# Patient Record
Sex: FEMALE | Race: WHITE | NOT HISPANIC OR LATINO | Employment: FULL TIME | URBAN - NONMETROPOLITAN AREA
[De-identification: names, ages, dates, MRNs, and addresses within clinical notes are randomized per-mention and may not be internally consistent; named-entity substitution may affect disease eponyms.]

---

## 2020-06-02 ENCOUNTER — HISTORICAL (OUTPATIENT)
Dept: ADMINISTRATIVE | Facility: HOSPITAL | Age: 35
End: 2020-06-02

## 2020-06-25 LAB — POC BETA-HCG (QUAL): NEGATIVE

## 2020-09-23 ENCOUNTER — HISTORICAL (OUTPATIENT)
Dept: ADMINISTRATIVE | Facility: HOSPITAL | Age: 35
End: 2020-09-23

## 2021-08-05 LAB
BILIRUB SERPL-MCNC: NEGATIVE MG/DL
BLOOD URINE, POC: NEGATIVE
CLARITY, POC UA: CLEAR
COLOR, POC UA: YELLOW
GLUCOSE UR QL STRIP: NEGATIVE
HUMAN PAPILLOMAVIRUS (HPV): NORMAL
KETONES UR QL STRIP: NEGATIVE
LEUKOCYTE EST, POC UA: NEGATIVE
NITRITE, POC UA: NEGATIVE
PAP RECOMMENDATION EXT: NORMAL
PAP SMEAR: NORMAL
PH, POC UA: 7
PROTEIN, POC: NEGATIVE
SPECIFIC GRAVITY, POC UA: 1.01
UROBILINOGEN, POC UA: NORMAL

## 2021-08-11 ENCOUNTER — HISTORICAL (OUTPATIENT)
Dept: ADMINISTRATIVE | Facility: HOSPITAL | Age: 36
End: 2021-08-11

## 2021-08-11 LAB
EST. AVERAGE GLUCOSE BLD GHB EST-MCNC: 96 MG/DL (ref 70–115)
HBA1C MFR BLD: 5.2 % (ref 4–6)
T4 FREE SERPL-MCNC: 0.91 NG/DL (ref 0.78–2.19)
TSH SERPL-ACNC: 1.06 UIU/ML (ref 0.36–3.74)

## 2021-10-14 LAB — POC BETA-HCG (QUAL): NEGATIVE

## 2022-04-10 ENCOUNTER — HISTORICAL (OUTPATIENT)
Dept: ADMINISTRATIVE | Facility: HOSPITAL | Age: 37
End: 2022-04-10

## 2022-04-20 LAB
PAP RECOMMENDATION EXT: NORMAL
PAP SMEAR: NORMAL

## 2022-04-30 VITALS
WEIGHT: 166.69 LBS | HEIGHT: 60 IN | WEIGHT: 176.38 LBS | HEIGHT: 61 IN | DIASTOLIC BLOOD PRESSURE: 80 MMHG | BODY MASS INDEX: 32.73 KG/M2 | SYSTOLIC BLOOD PRESSURE: 130 MMHG | DIASTOLIC BLOOD PRESSURE: 90 MMHG | BODY MASS INDEX: 33.3 KG/M2 | SYSTOLIC BLOOD PRESSURE: 132 MMHG

## 2022-05-02 NOTE — HISTORICAL [facility] CERNER
This is a historical note converted from Javier. Formatting and pictures may have been removed.  Please reference Javier for original formatting and attached multimedia. Chief Complaint  back pain  History of Present Illness  Shes had nasal congestion, sneezing, post nasal drip for about 2 weeks.? Those symptoms have worsened over the last several days and shes coughing more now.? 2 days ago she had severe coughing episodes and she felt a sudden sharp pain in left back that has persisted.? She think she heard a pop during that episode.? She has mild DALY.? She has mild postnasal mucous drip.? She hasnt had any fever.? She denies hemoptysis.  Review of Systems  no vomiting or diarrhea or abdominal pain  no Covid exposure  Physical Exam  Vitals & Measurements  T:?37.3? ?C (Oral)? HR:?87(Peripheral)? BP:?132/90?  HT:?154.50?cm? WT:?80.000?kg? BMI:?33.51?  she does not appear to be in distress  conjunctiva clear  dark lower eyelids  OP/OC clear  neck supple without LAD  heart is RRR without murmurs  shes breathing normally, she has a few faint wheezes?on expiration, she has some inspiratory rales in the lung bases, she has equal breath sounds in all areas  she is tender to palpation in the left lower thoracic muscles posteriorly - fairly localized  ?  ?  2 view CXR with one extra view of the posterior ribs on the left side shows some bronchial wall thickening, no rib fractures, no focal infiltrates, no pneumothorax  Assessment/Plan  1.?Bronchitis ? J40  2.?Sinusitis ? J32.9  Orders:  albuterol, 2 puff(s), INH, q4hr, PRN PRN for wheezing, # 1 EA, 0 Refill(s), called to pharmacy (Rx)  azithromycin, = 1 packet(s), Oral, As Directed, per package labeling, X 5 day(s), # 6 tab(s), 0 Refill(s), called to pharmacy (Rx)  cefdinir, 300 mg = 1 cap(s), Oral, q12hr, X 10 day(s), # 20 cap(s), 0 Refill(s), called to pharmacy (Rx)  predniSONE, 40 mg = 2 tab(s), Oral, Daily, X 3 day(s), # 6 tab(s), 0 Refill(s), called to pharmacy  (Rx)  traMADol, 100 mg = 1 tab(s), Oral, q6hr, PRN PRN as needed for pain, take with 500mg of acetaminophen, # 30 tab(s), 0 Refill(s), called to pharmacy (Rx)  ?  I think the back pain is muscle strain related  call if the symptoms worsen or persist   Problem List/Past Medical History  Ongoing  High risk HPV infection  Obesity  Historical  Pregnant  Pregnant  Pregnant  Pregnant  Procedure/Surgical History  PAP (2020)   section (10/13/2016)  Tubal ligation (10/13/2016)   section (10/22/2010)   section (2009)   section (2004)   Medications  cefdinir 300 mg oral capsule, 300 mg= 1 cap(s), Oral, q12hr  human papillomavirus vaccine 9-valent intramuscular suspension, 0.5 mL, IM, Once  prednisONE 20 mg oral tablet, 40 mg= 2 tab(s), Oral, Daily  ProAir HFA 90 mcg/inh inhalation aerosol with adapter, 2 puff(s), INH, q4hr, PRN  traMADol 100 mg oral tablet, 100 mg= 1 tab(s), Oral, q6hr, PRN  Zithromax Z-Rogelio 250 mg oral tablet, 1 packet(s), Oral, As Directed  Allergies  No Known Allergies  No Known Medication Allergies  Social History  Abuse/Neglect  No, No, Yes, 2020  Alcohol  Past, 2020  Sexual  Sexually active: Yes. No, 2020  Substance Use  Never, 2020  Tobacco  Never (less than 100 in lifetime), N/A, 2020  Family History  Hyperlipidemia: Mother.  Hypertension.: Father.  Primary malignant neoplasm of breast: Negative: Mother, Father, Sister and Brother.  Primary malignant neoplasm of colon: Negative: Mother, Father, Sister and Brother.  Primary malignant neoplasm of female genital organ: Negative: Mother, Father, Sister and Brother.  Immunizations  Vaccine Date Status Comments   human papillomavirus vaccine 2020 Given Pt tolerated well. -KO 2020  EXP: 2021   influenza virus vaccine, inactivated 10/14/2016 Recorded    tetanus/diphtheria/pertussis, acel(Tdap) 2016 Recorded    influenza virus vaccine, inactivated 2016  Recorded    measles/mumps/rubella virus vaccine 06/28/1997 Recorded    poliovirus vaccine, live, trivalent 02/19/1987 Recorded    poliovirus vaccine, live, trivalent 05/03/1984 Recorded    measles/mumps/rubella virus vaccine 05/03/1984 Recorded    poliovirus vaccine, live, trivalent 1983 Recorded    poliovirus vaccine, live, trivalent 1983 Recorded    Health Maintenance  Health Maintenance  ???Pending?(in the next year)  ??? ??Due?  ??? ? ? ?Influenza Vaccine due??09/23/20??and every?  ??? ??Due In Future?  ??? ? ? ?Obesity Screening not due until??01/01/21??and every 1??year(s)  ??? ? ? ?Alcohol Misuse Screening not due until??01/02/21??and every 1??year(s)  ???Satisfied?(in the past 1 year)  ??? ??Satisfied?  ??? ? ? ?ADL Screening on??09/23/20.??Satisfied by Mendy Novoa  ??? ? ? ?Alcohol Misuse Screening on??09/23/20.??Satisfied by Mendy Novoa  ??? ? ? ?Blood Pressure Screening on??09/23/20.??Satisfied by Mendy Novoa  ??? ? ? ?Body Mass Index Check on??09/23/20.??Satisfied by Mendy Novoa  ??? ? ? ?Cervical Cancer Screening on??06/14/20.  ??? ? ? ?Depression Screening on??09/23/20.??Satisfied by Mendy Novoa  ??? ? ? ?Obesity Screening on??09/23/20.??Satisfied by Mendy Novoa  ?

## 2022-05-04 ENCOUNTER — HISTORICAL (OUTPATIENT)
Dept: ADMINISTRATIVE | Facility: HOSPITAL | Age: 37
End: 2022-05-04

## 2022-09-21 ENCOUNTER — HISTORICAL (OUTPATIENT)
Dept: ADMINISTRATIVE | Facility: HOSPITAL | Age: 37
End: 2022-09-21

## 2023-02-02 ENCOUNTER — HOSPITAL ENCOUNTER (OUTPATIENT)
Dept: RADIOLOGY | Facility: HOSPITAL | Age: 38
Discharge: HOME | End: 2023-02-02
Attending: OBSTETRICS & GYNECOLOGY
Payer: COMMERCIAL

## 2023-02-02 ENCOUNTER — DOCUMENTATION ONLY (OUTPATIENT)
Dept: ADMINISTRATIVE | Facility: HOSPITAL | Age: 38
End: 2023-02-02
Payer: COMMERCIAL

## 2023-02-02 VITALS — WEIGHT: 165 LBS | HEIGHT: 60 IN | BODY MASS INDEX: 32.39 KG/M2

## 2023-02-02 DIAGNOSIS — Z12.31 BREAST CANCER SCREENING BY MAMMOGRAM: ICD-10-CM

## 2023-02-02 DIAGNOSIS — R10.2 PELVIC PAIN IN FEMALE: ICD-10-CM

## 2023-02-02 PROCEDURE — 77067 SCR MAMMO BI INCL CAD: CPT | Mod: TC

## 2023-02-02 PROCEDURE — 76856 US EXAM PELVIC COMPLETE: CPT | Mod: TC

## 2023-07-21 ENCOUNTER — TELEPHONE (OUTPATIENT)
Dept: FAMILY MEDICINE | Facility: CLINIC | Age: 38
End: 2023-07-21
Payer: COMMERCIAL

## 2023-07-21 NOTE — TELEPHONE ENCOUNTER
Rosita called stating that she would like to become a new pt with Dr Enriquez. Rosita also stated that Dr Enriquez already sees her  and children. Rosita stated that she also seems to having high BP lately. Please advise.

## 2023-07-25 ENCOUNTER — TELEPHONE (OUTPATIENT)
Dept: FAMILY MEDICINE | Facility: CLINIC | Age: 38
End: 2023-07-25
Payer: COMMERCIAL

## 2023-07-26 ENCOUNTER — OFFICE VISIT (OUTPATIENT)
Dept: FAMILY MEDICINE | Facility: CLINIC | Age: 38
End: 2023-07-26
Payer: COMMERCIAL

## 2023-07-26 VITALS
SYSTOLIC BLOOD PRESSURE: 160 MMHG | BODY MASS INDEX: 32.12 KG/M2 | OXYGEN SATURATION: 100 % | HEIGHT: 60 IN | HEART RATE: 80 BPM | TEMPERATURE: 97 F | WEIGHT: 163.63 LBS | DIASTOLIC BLOOD PRESSURE: 114 MMHG

## 2023-07-26 DIAGNOSIS — E66.3 OVERWEIGHT: ICD-10-CM

## 2023-07-26 DIAGNOSIS — I10 HYPERTENSION, UNSPECIFIED TYPE: Primary | ICD-10-CM

## 2023-07-26 PROCEDURE — 1159F PR MEDICATION LIST DOCUMENTED IN MEDICAL RECORD: ICD-10-PCS | Mod: CPTII,,, | Performed by: PEDIATRICS

## 2023-07-26 PROCEDURE — 3077F PR MOST RECENT SYSTOLIC BLOOD PRESSURE >= 140 MM HG: ICD-10-PCS | Mod: CPTII,,, | Performed by: PEDIATRICS

## 2023-07-26 PROCEDURE — 3008F BODY MASS INDEX DOCD: CPT | Mod: CPTII,,, | Performed by: PEDIATRICS

## 2023-07-26 PROCEDURE — 3077F SYST BP >= 140 MM HG: CPT | Mod: CPTII,,, | Performed by: PEDIATRICS

## 2023-07-26 PROCEDURE — 99214 OFFICE O/P EST MOD 30 MIN: CPT | Mod: ,,, | Performed by: PEDIATRICS

## 2023-07-26 PROCEDURE — 1160F RVW MEDS BY RX/DR IN RCRD: CPT | Mod: CPTII,,, | Performed by: PEDIATRICS

## 2023-07-26 PROCEDURE — 3080F PR MOST RECENT DIASTOLIC BLOOD PRESSURE >= 90 MM HG: ICD-10-PCS | Mod: CPTII,,, | Performed by: PEDIATRICS

## 2023-07-26 PROCEDURE — 3008F PR BODY MASS INDEX (BMI) DOCUMENTED: ICD-10-PCS | Mod: CPTII,,, | Performed by: PEDIATRICS

## 2023-07-26 PROCEDURE — 1160F PR REVIEW ALL MEDS BY PRESCRIBER/CLIN PHARMACIST DOCUMENTED: ICD-10-PCS | Mod: CPTII,,, | Performed by: PEDIATRICS

## 2023-07-26 PROCEDURE — 99214 PR OFFICE/OUTPT VISIT, EST, LEVL IV, 30-39 MIN: ICD-10-PCS | Mod: ,,, | Performed by: PEDIATRICS

## 2023-07-26 PROCEDURE — 1159F MED LIST DOCD IN RCRD: CPT | Mod: CPTII,,, | Performed by: PEDIATRICS

## 2023-07-26 PROCEDURE — 3080F DIAST BP >= 90 MM HG: CPT | Mod: CPTII,,, | Performed by: PEDIATRICS

## 2023-07-26 RX ORDER — LISINOPRIL AND HYDROCHLOROTHIAZIDE 10; 12.5 MG/1; MG/1
1 TABLET ORAL DAILY
Qty: 90 TABLET | Refills: 3 | Status: SHIPPED | OUTPATIENT
Start: 2023-07-26 | End: 2024-07-25

## 2023-07-26 NOTE — PROGRESS NOTES
Subjective     Patient ID: Rosita Eisenberg is a 38 y.o. female.    Chief Complaint: Establish Care (HTN)    HPI     She has noticed increased blood pressure readings for the last few weeks.  Her readings at home have been 150-160's/'s.  She's otherwise been well.     Objective     Physical Exam     She looks well  Sclera and conjunctiva normal  Neck supple without LAD or mass or thyromegaly or bruit  Heart RRR without murmurs, no gallops  Lungs clear, normal breathing  Abdomen soft without HSM or mass, no bruits  No edema  Systolic blood pressure in the leg by palpation is 160  Dorsalis pedis pulses in the feet are normal  No edema    Assessment and Plan     1. Hypertension, unspecified type    2. Overweight      She'll return for fasting blood tests  Renal U/S with vascular doppler to screen for DEEPTHI  We discussed diet modification, exercise, weight loss  I asked her to call in 1-2 weeks to let me know her home blood pressure readings

## 2023-07-27 DIAGNOSIS — I10 HYPERTENSION, UNSPECIFIED TYPE: Primary | ICD-10-CM

## 2023-07-27 PROCEDURE — 85025 COMPLETE CBC W/AUTO DIFF WBC: CPT | Performed by: PEDIATRICS

## 2023-07-27 PROCEDURE — 80061 LIPID PANEL: CPT | Performed by: PEDIATRICS

## 2023-07-27 PROCEDURE — 83036 HEMOGLOBIN GLYCOSYLATED A1C: CPT | Performed by: PEDIATRICS

## 2023-07-27 PROCEDURE — 84443 ASSAY THYROID STIM HORMONE: CPT | Performed by: PEDIATRICS

## 2023-07-27 PROCEDURE — 80053 COMPREHEN METABOLIC PANEL: CPT | Performed by: PEDIATRICS

## 2023-07-31 ENCOUNTER — HOSPITAL ENCOUNTER (OUTPATIENT)
Dept: RADIOLOGY | Facility: HOSPITAL | Age: 38
Discharge: HOME | End: 2023-07-31
Attending: PEDIATRICS
Payer: COMMERCIAL

## 2023-07-31 DIAGNOSIS — I10 HYPERTENSION, UNSPECIFIED TYPE: ICD-10-CM

## 2023-07-31 PROCEDURE — 76770 US EXAM ABDO BACK WALL COMP: CPT | Mod: TC

## 2023-08-18 ENCOUNTER — TELEPHONE (OUTPATIENT)
Dept: FAMILY MEDICINE | Facility: CLINIC | Age: 38
End: 2023-08-18
Payer: COMMERCIAL

## 2023-08-28 PROCEDURE — 80048 BASIC METABOLIC PNL TOTAL CA: CPT | Performed by: PEDIATRICS

## 2024-05-21 ENCOUNTER — PATIENT MESSAGE (OUTPATIENT)
Dept: FAMILY MEDICINE | Facility: CLINIC | Age: 39
End: 2024-05-21
Payer: COMMERCIAL

## 2024-06-03 ENCOUNTER — TELEPHONE (OUTPATIENT)
Dept: OBSTETRICS AND GYNECOLOGY | Facility: CLINIC | Age: 39
End: 2024-06-03
Payer: COMMERCIAL

## 2024-06-19 NOTE — PROGRESS NOTES
Chief Complaint:   Well Woman     History of Present Illness:  Rosita Eisenberg is a 39 y.o. year old  sp BTL  presents for her well woman exam.  Having regualr monthly cycles lasting 7-10 days with moderate to heavy bleeding. Changing a pad every 2 hours, requiring a pad and tampon at the same time. +soils clothes. Denies any irregular menstrual bleeding. +dysmenorrhea. Tried OTC medications, no improvement. History of OCPs and IUD with no improvement.      Gyn History:  Menstrual History   Cycle: Yes (2023)  Menarche Age: 9 years  Flow Duration: 10  Flow: (!) Heavy  Interval: 28  Intermenstrual Bleeding: No  Dysmenorrhea: Yes  Dysmenorrhea Severity : Moderate (moderate to severe)  [place]  Sexually Active: Yes  Sexual Orientation: heterosexual  Postcoital Bleeding: No  Dyspareunia: No  STI History: No  Contraception: Yes  Contraception Type: Tubal ligation/salpingectony  Menopause  Menopause Age: 1 years  Breast History  Last Breast Imaging Date: Yes  Date: 23 (benign)  History of Abnormal Breast Imaging : No  History of Breast Biopsy: No  Pap History   Last pap date: 23  Result: Normal  History of Abnormal Pap: No  HPV Vaccine Completed: No (1/3)      Review of Systems:  General/Constitutional: Chills denies. Fatigue/weakness denies. Fever denies. Night sweats denies. Hot flashes denies    Respiratory: Cough denies. Hemoptysis denies. SOB denies. Sputum production denies. Wheezing denies .   Cardiovascular: Chest pain denies. Dizziness denies. Palpitations denies. Swelling in hands/feet denies.                Breast: Dimpling denies. Breast mass denies. Breast pain/tenderness denies. Nipple discharge denies. Puckering denies.  Gastrointestinal: Abdominal pain denies. Blood in stool denies. Constipation denies. Diarrhea denies. Heartburn denies. Nausea denies. Vomiting denies    Genitourinary: Incontinence denies. Blood in urine denies. Frequent urination denies. Painful urination  denies. Urinary urgency denies. Nocturia denies    Gynecologic: Irregular menses denies. Heavy bleeding ADMITS. Painful menses denies. Vaginal discharge denies. Vaginal odor denies. Vaginal itching denies. Vaginal lesion denies. Pelvic pain denies. Decreased libido denies. Vulvar lesion denies. Prolapse of genital organs denies. Painful intercourse denies. Postcoital bleeding denies    Psychiatric: Depression denies. Anxiety denies.     OB History    Para Term  AB Living   4 4 4 0 0 4   SAB IAB Ectopic Multiple Live Births   0 0 0 0 4      # 1 - Date: 04, Sex: Female, Weight: 2.903 kg (6 lb 6.4 oz), GA: None, Type: , Unspecified, Apgar1: None, Apgar5: None, Living: Living, Birth Comments: None    # 2 - Date: 09, Sex: Male, Weight: 3.357 kg (7 lb 6.4 oz), GA: None, Type: , Unspecified, Apgar1: None, Apgar5: None, Living: Living, Birth Comments: None    # 3 - Date: 10/22/10, Sex: Male, Weight: 2.785 kg (6 lb 2.2 oz), GA: None, Type: , Unspecified, Apgar1: None, Apgar5: None, Living: Living, Birth Comments: None    # 4 - Date: 10/13/16, Sex: Female, Weight: 2.781 kg (6 lb 2.1 oz), GA: None, Type: , Unspecified, Apgar1: None, Apgar5: None, Living: Living, Birth Comments: None      Past Medical History:   Diagnosis Date    Amenorrhea     HPV (human papilloma virus) infection     Hypertension        Current Outpatient Medications:     lisinopriL-hydrochlorothiazide (PRINZIDE,ZESTORETIC) 10-12.5 mg per tablet, Take 1 tablet by mouth once daily., Disp: 90 tablet, Rfl: 3    Review of patient's allergies indicates:  No Known Allergies    Past Surgical History:   Procedure Laterality Date     SECTION  2004     SECTION  10/22/2010     SECTION  2009     SECTION  10/13/2016    INTRAUTERINE DEVICE INSERTION  10/14/2021    TUBAL LIGATION  10/13/2016     Family History   Problem Relation Name Age of Onset    Hypertension Father  Luis Navarroune     Breast cancer Neg Hx      Colon cancer Neg Hx      Ovarian cancer Neg Hx      Uterine cancer Neg Hx       Social History     Socioeconomic History    Marital status:    Tobacco Use    Smoking status: Never     Passive exposure: Yes    Smokeless tobacco: Never   Substance and Sexual Activity    Alcohol use: Not Currently     Comment: occasional    Drug use: Never    Sexual activity: Yes     Partners: Male     Birth control/protection: Other-see comments, None     Comment: Tubal       Physical Exam:  /80 (BP Location: Left arm, Patient Position: Sitting, BP Method: Medium (Manual))   Ht 5' (1.524 m)   Wt 72.8 kg (160 lb 6.4 oz)   LMP 05/31/2024 (Exact Date)   BMI 31.33 kg/m²     Chaperone: present.       General appearance: healthy, well-nourished and well-developed     Psychiatric: Orientation to time, place and person. Normal mood and affect and active, alert     Skin: Appearance: no rashes or lesions.     Neck:   Neck: supple, FROM, trachea midline. and no masses   Thyroid: no enlargement or nodules and non-tender.       Cardiovascular:   Auscultation: RRR and no murmur.   Peripheral Vascular: no varicosities, LLE edema, RLE edema, calf tenderness, and palpable cord and pedal pulses intact.     Lungs:   Respiratory effort: no intercostal retractions or accessory muscle usage.   Auscultation: no wheezing, rales/crackles, or rhonchi and clear to auscultation.     Breast:   Inspection/Palpation: no tenderness, discrete/distinct masses, skin changes, or abnormal secretions. Nipple appearance normal.     Abdomen:   Auscultation/Inspection/Palpation: no hepatomegaly, splenomegaly, masses, tenderness or CVA tenderness and soft, non-distended bowel sounds preset.    Hernia: no palpable hernias.     Female Genitalia:    Vulva: no masses, tenderness or lesions    Bladder/Urethra: no urethral discharge or mass, normal meatus, bladder non-distended.    Vagina: no tenderness, erythema,  cystocele, rectocele, abnormal vaginal discharge or vesicle(s) or ulcers    Cervix: no discharge, no cervical lacerations noted or motion tenderness and grossly normal    Uterus: normal size and shape and midline, non-tender, and no uterine prolapse.    Adnexa/Parametria: no parametrial tenderness or mass, no adnexal tenderness or ovarian mass.     Lymph Nodes:   Palpation: non tender submandibular nodes, axillary nodes, or inguinal nodes.     Rectal Exam:   Rectum: normal perianal skin.       Assessment/Plan:  1. Well woman exam  Pap gc/cz/tv  Recommend BSE monthly   Discussed recommendations of annual screening after age of 40 with mammogram  Explained that screening is not 100% reliable. Advised patient if she notices any changes to her breast including a lump, mass, dimpling, discharge, rash, or tenderness she should contact us immediately.     Healthy diet, exercise   MMG  Multivitamin   Seat belt   Sunscreen use   Safe sex/ STI education  Contraception: BTL  Annual labs: w/ Dr. Enriquez    2. Menorrhagia with regular cycle; h/o BTL with 4th CS  Educated  S/p BTL  Cultures: gc/cz/tv/myco/urea  Labs: CBC, FT4, TSH  Bleeding/ER precautions    Educated patient on abnormal uterine bleeding     Reviewed workup results       Discussed with patient conservative management, medical management, Mirena IUD, or surgical management.  Will discuss further on RTC.    Follow up 3 weeks        This note was transcribed by Enma Valderrama MA. There may be transcription errors as a result, however minimal. Effort has been made to ensure accuracy of transcription, but any obvious errors or omissions should be clarified with the author of the document.

## 2024-06-20 ENCOUNTER — OFFICE VISIT (OUTPATIENT)
Dept: OBSTETRICS AND GYNECOLOGY | Facility: CLINIC | Age: 39
End: 2024-06-20
Payer: COMMERCIAL

## 2024-06-20 VITALS
DIASTOLIC BLOOD PRESSURE: 80 MMHG | BODY MASS INDEX: 31.49 KG/M2 | WEIGHT: 160.38 LBS | SYSTOLIC BLOOD PRESSURE: 130 MMHG | HEIGHT: 60 IN

## 2024-06-20 DIAGNOSIS — N92.0 MENORRHAGIA WITH REGULAR CYCLE: ICD-10-CM

## 2024-06-20 DIAGNOSIS — Z12.4 SCREENING FOR MALIGNANT NEOPLASM OF THE CERVIX: ICD-10-CM

## 2024-06-20 DIAGNOSIS — Z01.419 WELL WOMAN EXAM: Primary | ICD-10-CM

## 2024-06-20 DIAGNOSIS — Z11.3 SCREENING EXAMINATION FOR VENEREAL DISEASE: ICD-10-CM

## 2024-06-20 PROCEDURE — 1159F MED LIST DOCD IN RCRD: CPT | Mod: CPTII,,, | Performed by: OBSTETRICS & GYNECOLOGY

## 2024-06-20 PROCEDURE — 99396 PREV VISIT EST AGE 40-64: CPT | Mod: ,,, | Performed by: OBSTETRICS & GYNECOLOGY

## 2024-06-20 PROCEDURE — 3079F DIAST BP 80-89 MM HG: CPT | Mod: CPTII,,, | Performed by: OBSTETRICS & GYNECOLOGY

## 2024-06-20 PROCEDURE — 87661 TRICHOMONAS VAGINALIS AMPLIF: CPT | Performed by: OBSTETRICS & GYNECOLOGY

## 2024-06-20 PROCEDURE — 3008F BODY MASS INDEX DOCD: CPT | Mod: CPTII,,, | Performed by: OBSTETRICS & GYNECOLOGY

## 2024-06-20 PROCEDURE — 3075F SYST BP GE 130 - 139MM HG: CPT | Mod: CPTII,,, | Performed by: OBSTETRICS & GYNECOLOGY

## 2024-06-20 PROCEDURE — 4010F ACE/ARB THERAPY RXD/TAKEN: CPT | Mod: CPTII,,, | Performed by: OBSTETRICS & GYNECOLOGY

## 2024-06-20 PROCEDURE — 87491 CHLMYD TRACH DNA AMP PROBE: CPT | Performed by: OBSTETRICS & GYNECOLOGY

## 2024-06-20 PROCEDURE — 87624 HPV HI-RISK TYP POOLED RSLT: CPT | Performed by: OBSTETRICS & GYNECOLOGY

## 2024-06-20 PROCEDURE — 87591 N.GONORRHOEAE DNA AMP PROB: CPT | Performed by: OBSTETRICS & GYNECOLOGY

## 2024-07-29 ENCOUNTER — HOSPITAL ENCOUNTER (OUTPATIENT)
Dept: RADIOLOGY | Facility: HOSPITAL | Age: 39
Discharge: HOME | End: 2024-07-29
Attending: PEDIATRICS
Payer: COMMERCIAL

## 2024-07-29 ENCOUNTER — OFFICE VISIT (OUTPATIENT)
Dept: FAMILY MEDICINE | Facility: CLINIC | Age: 39
End: 2024-07-29
Payer: COMMERCIAL

## 2024-07-29 VITALS
OXYGEN SATURATION: 98 % | SYSTOLIC BLOOD PRESSURE: 132 MMHG | HEIGHT: 60 IN | HEART RATE: 68 BPM | BODY MASS INDEX: 31.25 KG/M2 | WEIGHT: 159.19 LBS | TEMPERATURE: 97 F | DIASTOLIC BLOOD PRESSURE: 80 MMHG

## 2024-07-29 DIAGNOSIS — Z12.31 SCREENING MAMMOGRAM FOR BREAST CANCER: ICD-10-CM

## 2024-07-29 DIAGNOSIS — I10 HYPERTENSION, UNSPECIFIED TYPE: ICD-10-CM

## 2024-07-29 DIAGNOSIS — Z00.00 WELLNESS EXAMINATION: Primary | ICD-10-CM

## 2024-07-29 PROCEDURE — 4010F ACE/ARB THERAPY RXD/TAKEN: CPT | Mod: CPTII,,, | Performed by: PEDIATRICS

## 2024-07-29 PROCEDURE — 3044F HG A1C LEVEL LT 7.0%: CPT | Mod: CPTII,,, | Performed by: PEDIATRICS

## 2024-07-29 PROCEDURE — 3079F DIAST BP 80-89 MM HG: CPT | Mod: CPTII,,, | Performed by: PEDIATRICS

## 2024-07-29 PROCEDURE — 99396 PREV VISIT EST AGE 40-64: CPT | Mod: ,,, | Performed by: PEDIATRICS

## 2024-07-29 PROCEDURE — 77063 BREAST TOMOSYNTHESIS BI: CPT | Mod: TC

## 2024-07-29 PROCEDURE — 1159F MED LIST DOCD IN RCRD: CPT | Mod: CPTII,,, | Performed by: PEDIATRICS

## 2024-07-29 PROCEDURE — 1160F RVW MEDS BY RX/DR IN RCRD: CPT | Mod: CPTII,,, | Performed by: PEDIATRICS

## 2024-07-29 PROCEDURE — 3075F SYST BP GE 130 - 139MM HG: CPT | Mod: CPTII,,, | Performed by: PEDIATRICS

## 2024-07-29 PROCEDURE — 3008F BODY MASS INDEX DOCD: CPT | Mod: CPTII,,, | Performed by: PEDIATRICS

## 2024-07-29 RX ORDER — LISINOPRIL AND HYDROCHLOROTHIAZIDE 10; 12.5 MG/1; MG/1
1 TABLET ORAL DAILY
Qty: 90 TABLET | Refills: 3 | Status: SHIPPED | OUTPATIENT
Start: 2024-07-29 | End: 2025-07-29

## 2024-08-17 NOTE — PROGRESS NOTES
Subjective     Patient ID: Rosita Eisenberg is a 39 y.o. female.    Chief Complaint: Wellness    HPI    She's here for a wellness visit.  She's doing well.      Objective     Physical Exam  Constitutional:       Appearance: Normal appearance.   Eyes:      General: No scleral icterus.     Pupils: Pupils are equal, round, and reactive to light.   Cardiovascular:      Rate and Rhythm: Normal rate and regular rhythm.      Heart sounds: Normal heart sounds.   Pulmonary:      Effort: Pulmonary effort is normal.      Breath sounds: Normal breath sounds.   Abdominal:      General: Bowel sounds are normal. There is no distension.      Palpations: There is no mass.      Tenderness: There is no abdominal tenderness.      Comments: No HSM          Assessment and Plan     1. Wellness examination    2. Screening mammogram for breast cancer  -     Mammo Digital Screening Bilat w/ [facility]; Future; Expected date: 07/29/2024    3. Hypertension, unspecified type  -     lisinopriL-hydrochlorothiazide (PRINZIDE,ZESTORETIC) 10-12.5 mg per tablet; Take 1 tablet by mouth once daily.  Dispense: 90 tablet; Refill: 3      Work on diet modification, exercise, weight loss  Monitor blood pressure and call if not to our goal  Return in one year with CMP, lipid panel, A1C

## 2024-09-04 DIAGNOSIS — I10 HYPERTENSION, UNSPECIFIED TYPE: ICD-10-CM

## 2024-09-04 RX ORDER — LISINOPRIL AND HYDROCHLOROTHIAZIDE 10; 12.5 MG/1; MG/1
1 TABLET ORAL DAILY
Qty: 90 TABLET | Refills: 3 | Status: SHIPPED | OUTPATIENT
Start: 2024-09-04 | End: 2025-09-04

## 2025-05-05 ENCOUNTER — TELEPHONE (OUTPATIENT)
Dept: FAMILY MEDICINE | Facility: CLINIC | Age: 40
End: 2025-05-05

## 2025-05-05 ENCOUNTER — OFFICE VISIT (OUTPATIENT)
Dept: FAMILY MEDICINE | Facility: CLINIC | Age: 40
End: 2025-05-05
Payer: COMMERCIAL

## 2025-05-05 VITALS
HEART RATE: 85 BPM | HEIGHT: 60 IN | WEIGHT: 142.81 LBS | SYSTOLIC BLOOD PRESSURE: 110 MMHG | BODY MASS INDEX: 28.04 KG/M2 | OXYGEN SATURATION: 98 % | TEMPERATURE: 98 F | DIASTOLIC BLOOD PRESSURE: 70 MMHG

## 2025-05-05 DIAGNOSIS — R53.83 FATIGUE, UNSPECIFIED TYPE: Primary | ICD-10-CM

## 2025-05-05 DIAGNOSIS — B37.9 CANDIDIASIS: ICD-10-CM

## 2025-05-05 PROCEDURE — 4010F ACE/ARB THERAPY RXD/TAKEN: CPT | Mod: CPTII,,, | Performed by: PEDIATRICS

## 2025-05-05 PROCEDURE — 99213 OFFICE O/P EST LOW 20 MIN: CPT | Mod: ,,, | Performed by: PEDIATRICS

## 2025-05-05 PROCEDURE — 3074F SYST BP LT 130 MM HG: CPT | Mod: CPTII,,, | Performed by: PEDIATRICS

## 2025-05-05 PROCEDURE — 1160F RVW MEDS BY RX/DR IN RCRD: CPT | Mod: CPTII,,, | Performed by: PEDIATRICS

## 2025-05-05 PROCEDURE — 3078F DIAST BP <80 MM HG: CPT | Mod: CPTII,,, | Performed by: PEDIATRICS

## 2025-05-05 PROCEDURE — 1159F MED LIST DOCD IN RCRD: CPT | Mod: CPTII,,, | Performed by: PEDIATRICS

## 2025-05-05 PROCEDURE — 3008F BODY MASS INDEX DOCD: CPT | Mod: CPTII,,, | Performed by: PEDIATRICS

## 2025-05-05 RX ORDER — NYSTATIN 100000 [USP'U]/G
POWDER TOPICAL 3 TIMES DAILY
Qty: 60 G | Refills: 0 | Status: SHIPPED | OUTPATIENT
Start: 2025-05-05 | End: 2025-05-15

## 2025-05-05 NOTE — PROGRESS NOTES
Subjective     Patient ID: Rosita Eisenberg is a 40 y.o. female.    Chief Complaint: Dizziness    HPI    She has light headed symptoms when she stands over the last several months.  The episodes last less than one minute usually.  They are a little more frequent. She has lost weight over the last 2 years after some diet changes.  She started checking her blood pressure recently and it has been around 100 systolic a few times.        Objective     Physical Exam     She looks well  Sclera and conjunctiva clear  Neck supple without LAD or mass, no bruit  Heart RRR without murmurs  Lungs clear, normal breathing  Abdomen soft without distension or tenderness, no HSM or bruit  No edema    Assessment and Plan     1. Fatigue, unspecified type  -     Cancel: TSH; Future; Expected date: 05/05/2025  -     Cancel: CBC Auto Differential; Future; Expected date: 05/05/2025  -     CBC Auto Differential; Future; Expected date: 05/05/2025  -     TSH; Future; Expected date: 05/05/2025        Stop lisinopril  Check above tests  Hydrate  Monitor blood pressure and let us know how it is over the next few weeks

## 2025-05-07 ENCOUNTER — RESULTS FOLLOW-UP (OUTPATIENT)
Dept: FAMILY MEDICINE | Facility: CLINIC | Age: 40
End: 2025-05-07

## 2025-05-07 ENCOUNTER — PATIENT MESSAGE (OUTPATIENT)
Dept: FAMILY MEDICINE | Facility: CLINIC | Age: 40
End: 2025-05-07
Payer: COMMERCIAL

## 2025-05-07 DIAGNOSIS — R53.83 FATIGUE, UNSPECIFIED TYPE: Primary | ICD-10-CM

## 2025-05-07 LAB
BASOPHILS # BLD AUTO: 0.1 X10E3/UL (ref 0–0.2)
BASOPHILS NFR BLD AUTO: 1 %
EOSINOPHIL # BLD AUTO: 0.2 X10E3/UL (ref 0–0.4)
EOSINOPHIL NFR BLD AUTO: 2 %
ERYTHROCYTE [DISTWIDTH] IN BLOOD BY AUTOMATED COUNT: 13.6 % (ref 11.7–15.4)
FOLATE SERPL-MCNC: 3.8 NG/ML
HCT VFR BLD AUTO: 42.3 % (ref 34–46.6)
HGB BLD-MCNC: 12.9 G/DL (ref 11.1–15.9)
IMM GRANULOCYTES NFR BLD AUTO: 0 %
LYMPHOCYTES # BLD AUTO: 2.1 X10E3/UL (ref 0.7–3.1)
LYMPHOCYTES NFR BLD AUTO: 27 %
MCH RBC QN AUTO: 31.4 PG (ref 26.6–33)
MCHC RBC AUTO-ENTMCNC: 30.5 G/DL (ref 31.5–35.7)
MCV RBC AUTO: 103 FL (ref 79–97)
MONOCYTES # BLD AUTO: 0.8 X10E3/UL (ref 0.1–0.9)
MONOCYTES NFR BLD AUTO: 11 %
NEUTROPHILS # BLD AUTO: 4.6 X10E3/UL (ref 1.4–7)
NEUTROPHILS NFR BLD AUTO: 59 %
PLATELET # BLD AUTO: 270 X10E3/UL (ref 150–450)
RBC # BLD AUTO: 4.11 X10E6/UL (ref 3.77–5.28)
SPECIMEN STATUS REPORT: NORMAL
TSH SERPL DL<=0.005 MIU/L-ACNC: 1.18 UIU/ML (ref 0.45–4.5)
VIT B12 SERPL-MCNC: >2000 PG/ML (ref 232–1245)
WBC # BLD AUTO: 7.8 X10E3/UL (ref 3.4–10.8)

## 2025-06-03 ENCOUNTER — OFFICE VISIT (OUTPATIENT)
Dept: OBSTETRICS AND GYNECOLOGY | Facility: CLINIC | Age: 40
End: 2025-06-03
Payer: COMMERCIAL

## 2025-06-03 VITALS
DIASTOLIC BLOOD PRESSURE: 82 MMHG | WEIGHT: 142.63 LBS | BODY MASS INDEX: 28 KG/M2 | SYSTOLIC BLOOD PRESSURE: 134 MMHG | HEIGHT: 60 IN

## 2025-06-03 DIAGNOSIS — Z01.419 WELL WOMAN EXAM: Primary | ICD-10-CM

## 2025-06-03 DIAGNOSIS — N92.1 MENOMETRORRHAGIA: ICD-10-CM

## 2025-06-03 LAB
B-HCG UR QL: NEGATIVE
CTP QC/QA: YES

## 2025-06-09 ENCOUNTER — HOSPITAL ENCOUNTER (OUTPATIENT)
Dept: RADIOLOGY | Facility: HOSPITAL | Age: 40
Discharge: HOME | End: 2025-06-09
Attending: OBSTETRICS & GYNECOLOGY
Payer: COMMERCIAL

## 2025-06-09 DIAGNOSIS — N92.1 MENOMETRORRHAGIA: ICD-10-CM

## 2025-06-09 PROCEDURE — 76856 US EXAM PELVIC COMPLETE: CPT | Mod: TC

## 2025-08-11 ENCOUNTER — OFFICE VISIT (OUTPATIENT)
Dept: FAMILY MEDICINE | Facility: CLINIC | Age: 40
End: 2025-08-11
Payer: COMMERCIAL

## 2025-08-11 VITALS
BODY MASS INDEX: 28.23 KG/M2 | HEART RATE: 64 BPM | DIASTOLIC BLOOD PRESSURE: 92 MMHG | HEIGHT: 60 IN | TEMPERATURE: 98 F | WEIGHT: 143.81 LBS | SYSTOLIC BLOOD PRESSURE: 146 MMHG | OXYGEN SATURATION: 99 %

## 2025-08-11 DIAGNOSIS — Z00.129 ENCOUNTER FOR ROUTINE CHILD HEALTH EXAMINATION WITHOUT ABNORMAL FINDINGS: Primary | ICD-10-CM

## 2025-08-11 DIAGNOSIS — I10 HYPERTENSION, UNSPECIFIED TYPE: ICD-10-CM

## 2025-08-11 PROCEDURE — 3080F DIAST BP >= 90 MM HG: CPT | Mod: CPTII,,, | Performed by: PEDIATRICS

## 2025-08-11 PROCEDURE — 3044F HG A1C LEVEL LT 7.0%: CPT | Mod: CPTII,,, | Performed by: PEDIATRICS

## 2025-08-11 PROCEDURE — 99396 PREV VISIT EST AGE 40-64: CPT | Mod: ,,, | Performed by: PEDIATRICS

## 2025-08-11 PROCEDURE — 3008F BODY MASS INDEX DOCD: CPT | Mod: CPTII,,, | Performed by: PEDIATRICS

## 2025-08-11 PROCEDURE — 3077F SYST BP >= 140 MM HG: CPT | Mod: CPTII,,, | Performed by: PEDIATRICS

## 2025-08-11 PROCEDURE — 1159F MED LIST DOCD IN RCRD: CPT | Mod: CPTII,,, | Performed by: PEDIATRICS

## 2025-08-11 PROCEDURE — 4010F ACE/ARB THERAPY RXD/TAKEN: CPT | Mod: CPTII,,, | Performed by: PEDIATRICS

## 2025-08-11 RX ORDER — HYDROCHLOROTHIAZIDE 12.5 MG/1
12.5 TABLET ORAL EVERY MORNING
Qty: 90 TABLET | Refills: 3 | Status: SHIPPED | OUTPATIENT
Start: 2025-08-11

## 2025-08-11 RX ORDER — LISINOPRIL 5 MG/1
5 TABLET ORAL 2 TIMES DAILY
Qty: 180 TABLET | Refills: 3 | Status: SHIPPED | OUTPATIENT
Start: 2025-08-11